# Patient Record
Sex: MALE | Race: BLACK OR AFRICAN AMERICAN | NOT HISPANIC OR LATINO | URBAN - METROPOLITAN AREA
[De-identification: names, ages, dates, MRNs, and addresses within clinical notes are randomized per-mention and may not be internally consistent; named-entity substitution may affect disease eponyms.]

---

## 2024-11-02 ENCOUNTER — EMERGENCY (EMERGENCY)
Facility: HOSPITAL | Age: 54
LOS: 1 days | Discharge: HOME | End: 2024-11-02
Admitting: EMERGENCY MEDICINE
Payer: SELF-PAY

## 2024-11-02 VITALS
SYSTOLIC BLOOD PRESSURE: 154 MMHG | RESPIRATION RATE: 18 BRPM | HEART RATE: 64 BPM | OXYGEN SATURATION: 96 % | TEMPERATURE: 98 F | WEIGHT: 179.9 LBS | HEIGHT: 67 IN | DIASTOLIC BLOOD PRESSURE: 81 MMHG

## 2024-11-02 PROCEDURE — 99282 EMERGENCY DEPT VISIT SF MDM: CPT

## 2024-11-02 PROCEDURE — 99283 EMERGENCY DEPT VISIT LOW MDM: CPT

## 2024-11-02 NOTE — ED PROVIDER NOTE - IV ALTEPLASE ADMIN OUTSIDE HIDDEN
- Seems to have resolved, likely due to muscle spasms in setting of recent COVID infection and lumbar arthritis   - Advised to call if recurs  - Monitor symptoms and call with any concerns  - Could use lidocaine patch or cream on back for symptom relief   - Continue back exercises show

## 2024-11-02 NOTE — ED PROVIDER NOTE - CARE PROVIDERS DIRECT ADDRESSES
[email or web address]

## 2024-11-02 NOTE — ED PROVIDER NOTE - CLINICAL SUMMARY MEDICAL DECISION MAKING FREE TEXT BOX
55 y/o male w/ no sig pmh p/w b/l ear fullness, mild pain, and decreased hearing/ hearing a "sound" in b/l ears x approx 1 wk, which he thinks started after using qtips to clean wax out of his ears.  Notes mild rhinorrhea and cough.  Denies f/c, dizziness, n/v.  Does not think qtip cotton came off/ broke in ears.  Denies other trauma.  Exam with R ear canal with light brown cerumen present, appears far in canal, can somewhat visualize TM, but partially occluded.  No erythema seen to TM.  L TM occluded by slightly darker appearing cerumen in canal.    Suspect sx 2/2 cerumen presence/ slight impaction especially on L . No visible signs otitis media/externa  D/w pt risks/benefits of ED disimpaction. Will hold at this time  Can trial debrox and give close ENT f/u

## 2024-11-02 NOTE — ED PROVIDER NOTE - CARE PROVIDER_API CALL
Demarco Stevens  Otolaryngology  [address]  [place] [address]  Phone: [phone]  Fax: [phone]  Follow Up Time:

## 2024-11-02 NOTE — ED ADULT NURSE NOTE - NSFALLUNIVINTERV_ED_ALL_ED
Bed/Stretcher in lowest position, wheels locked, appropriate side rails in place/Call bell, personal items and telephone in reach/Instruct patient to call for assistance before getting out of bed/chair/stretcher/Non-slip footwear applied when patient is off stretcher/[place] to call system/Physically safe environment - no spills, clutter or unnecessary equipment/Purposeful proactive rounding/Room/bathroom lighting operational, light cord in reach

## 2024-11-02 NOTE — ED PROVIDER NOTE - PHYSICAL EXAMINATION
CONSTITUTIONAL: Awake, alert.  Nontoxic, no acute distress.    HEAD: Normocephalic, atraumatic.    EYES: Conjunctivae clear without exudates or hemorrhage. Sclera is non-icteric.    ENT:   Ears: External ear normal appearing without tenderness, R ear canal with light brown cerumen present, appears far in canal, can somewhat visualize TM, but partially occluded.  No erythema seen to TM.  L TM occluded by slightly darker appearing cerumen in canal.    Nose: Normal appearing nose, nasal mucosa is pink and moist. The nasal septum is midline, no septal hematoma. Nares are patent bilaterally.  Throat: Oral mucosa is pink and moist with good dentition. Tongue normal in appearance without lesions and with good symmetrical movement. No buccal nodules or lesions are noted. The pharynx is normal in appearance without tonsillar swelling or exudates.  Uvula midline.  No trismus.  No submandibular/ submental lymphadenopathy.    NECK: supple, trachea midline.    HEART:  Normal rate,    LUNGS:  No acute respiratory distress.  Non-tachypneic and non-labored.      MUSCULOSKELETAL:  Moving all extremities without issue.    SKIN: Skin in warm, dry and intact without rashes or lesions.  Appropriate color for ethnicity.    NEUROLOGICAL:  Patient is alert, oriented x person, place and time.    PSYCH: Appropriate mood and affect. Good judgment and insight.

## 2024-11-02 NOTE — ED PROVIDER NOTE - NSFOLLOWUPINSTRUCTIONS_ED_ALL_ED_FT
Thank you for visiting [facility] Emergency Department.      We saw you today for ear wax impaction.  You may try using over the counter DEBROX to try to remove wax.    Please follow up with ENT specialist in 1 week for re-evaluation.   Please know that no emergency visit is complete without follow-up with your primary care provider in 1 week.  Please bring copies of all discharge papers and results and show to your doctor.      Please continue taking all previous medications as instructed unless we discussed otherwise.     I appreciated your patience and hope you feel better soon.     Return to ER immediately if you develop fevers, chills, chest pain, shortness of breath, worsening pain, and/or any concerning symptoms.

## 2024-11-02 NOTE — ED PROVIDER NOTE - NS ED ROS FT
CONSTITUTIONAL: Denies fever and chills    HEENT: See HPI    RESPIRATORY: Denies SOB     CARDIOVASCULAR: Denies palpitations and chest pain.

## 2024-11-02 NOTE — ED PROVIDER NOTE - OBJECTIVE STATEMENT
55 y/o male w/ no sig pmh p/w b/l ear fullness, mild pain, and decreased hearing/ hearing a "sound" in b/l ears x approx 1 wk, which he thinks started after using qtips to clean wax out of his ears.  Notes mild rhinorrhea and cough.  Denies f/c, dizziness, n/v.  Does not think qtip cotton came off/ broke in ears.  Denies other trauma.

## 2024-11-06 DIAGNOSIS — H93.93 UNSPECIFIED DISORDER OF EAR, BILATERAL: ICD-10-CM

## 2024-11-06 DIAGNOSIS — H61.23 IMPACTED CERUMEN, BILATERAL: ICD-10-CM

## 2024-11-06 DIAGNOSIS — R05.9 COUGH, UNSPECIFIED: ICD-10-CM

## 2024-11-06 DIAGNOSIS — J34.89 OTHER SPECIFIED DISORDERS OF NOSE AND NASAL SINUSES: ICD-10-CM
